# Patient Record
Sex: MALE | Race: WHITE | HISPANIC OR LATINO
[De-identification: names, ages, dates, MRNs, and addresses within clinical notes are randomized per-mention and may not be internally consistent; named-entity substitution may affect disease eponyms.]

---

## 2024-04-10 ENCOUNTER — APPOINTMENT (OUTPATIENT)
Dept: ORTHOPEDIC SURGERY | Facility: CLINIC | Age: 59
End: 2024-04-10
Payer: MEDICAID

## 2024-04-10 VITALS — WEIGHT: 163 LBS | HEIGHT: 63 IN | BODY MASS INDEX: 28.88 KG/M2

## 2024-04-10 DIAGNOSIS — Z78.9 OTHER SPECIFIED HEALTH STATUS: ICD-10-CM

## 2024-04-10 DIAGNOSIS — M65.28 CALCIFIC TENDINITIS, OTHER SITE: ICD-10-CM

## 2024-04-10 DIAGNOSIS — M92.61 JUVENILE OSTEOCHONDROSIS OF TARSUS, RIGHT ANKLE: ICD-10-CM

## 2024-04-10 PROBLEM — Z00.00 ENCOUNTER FOR PREVENTIVE HEALTH EXAMINATION: Status: ACTIVE | Noted: 2024-04-10

## 2024-04-10 PROCEDURE — 73610 X-RAY EXAM OF ANKLE: CPT | Mod: RT

## 2024-04-10 PROCEDURE — E0114: CPT | Mod: NU

## 2024-04-10 PROCEDURE — 73650 X-RAY EXAM OF HEEL: CPT | Mod: RT

## 2024-04-10 PROCEDURE — 99203 OFFICE O/P NEW LOW 30 MIN: CPT

## 2024-04-10 RX ORDER — ERGOCALCIFEROL 1.25 MG/1
1.25 MG CAPSULE, LIQUID FILLED ORAL
Qty: 8 | Refills: 0 | Status: ACTIVE | COMMUNITY
Start: 2024-04-10 | End: 1900-01-01

## 2024-04-10 NOTE — DISCUSSION/SUMMARY
[de-identified] : Posterior heel pain, insertional achilles tendinitis negative julian, no significant swelling, appropriate resting plantar flexion Recommend Tall camboot 3 heel wedges, protected wb RTC 3 weeks   next visit: assess tenderness at achilles insertion consider decreasing from 3-2 heel wedges and then 2-1 Start PT at 4 weeks  no passive dorislexion past neutral for 6 weeks

## 2024-04-10 NOTE — PHYSICAL EXAM
Left message letting pt know his covid test came back negative and that I was calling to check in and see how he is feeling  Advised pt to return call to the office if he has any questions or concerns  [Right] : right foot and ankle [Mild] : mild swelling over achilles tendon [NL (40)] : plantar flexion 40 degrees [NL 30)] : inversion 30 degrees [NL (20)] : eversion 20 degrees [5___] : eversion 5[unfilled]/5 [2+] : posterior tibialis pulse: 2+ [] : antalgic

## 2024-04-10 NOTE — DATA REVIEWED
[FreeTextEntry1] : 3 views right ankle negative for fracture or dislocation.  There is evidence of a Sharon deformity.  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

## 2024-05-08 ENCOUNTER — APPOINTMENT (OUTPATIENT)
Dept: ORTHOPEDIC SURGERY | Facility: CLINIC | Age: 59
End: 2024-05-08